# Patient Record
Sex: MALE | Race: OTHER | HISPANIC OR LATINO
[De-identification: names, ages, dates, MRNs, and addresses within clinical notes are randomized per-mention and may not be internally consistent; named-entity substitution may affect disease eponyms.]

---

## 2019-09-30 ENCOUNTER — APPOINTMENT (OUTPATIENT)
Dept: ORTHOPEDIC SURGERY | Facility: CLINIC | Age: 84
End: 2019-09-30
Payer: MEDICARE

## 2019-09-30 VITALS
WEIGHT: 180 LBS | DIASTOLIC BLOOD PRESSURE: 73 MMHG | HEART RATE: 56 BPM | SYSTOLIC BLOOD PRESSURE: 172 MMHG | BODY MASS INDEX: 28.93 KG/M2 | HEIGHT: 66 IN

## 2019-09-30 DIAGNOSIS — M54.5 LOW BACK PAIN: ICD-10-CM

## 2019-09-30 PROCEDURE — 99204 OFFICE O/P NEW MOD 45 MIN: CPT

## 2019-09-30 PROCEDURE — 72100 X-RAY EXAM L-S SPINE 2/3 VWS: CPT | Mod: 26

## 2019-09-30 NOTE — PHYSICAL EXAM
[de-identified] : Lumbar xray today with degenerative lumbar spondylosis [de-identified] : Spine: CONSTITUTIONAL: The patient is a very pleasant individual who is well-nourished and who appears stated age.\par \par PSYCHIATRIC: The patient is alert and oriented X 3 and in no apparent distress.\par HEENT: Atraumatic and is nonsyndromic in appearance.\par \par RESPIRATORY: Respiratory rate is regular, No MARTINEZ\par \par LYMPHATICS: There is no inguinal lymphadenopathy\par \par INTEGUMENTARY: Skin is clean, dry, and intact about the bilateral lower extremities and lumbar spine.\par \par VASCULAR: There is brisk capillary refill about the bilateral lower extremities and dorsalis pedis pulses are 2/4. \par \par NEUROLOGIC: There are no pathologic reflexes. There is no decrease in sensation of the bilateral lower extremities on Wartenberg pinwheel examination. Deep tendon reflexes are well maintained at 2+/4 of the bilateral lower extremities and are symmetric. \par \par MUSCULOSKELETAL: There is no visible muscular atrophy. Manual motor strength is well maintained in the bilateral lower extremities. Range of motion of lumbar spine is well maintained. The patient ambulates in a non-myelopathic manner. Negative tension sign and straight leg raise bilaterally. Quad extension, ankle dorsiflexion, EHL, plantar flexion, and ankle eversion are well preserved. Normal secondary orthopaedic exam of bilateral piriformis, hips, greater trochanters, knees.

## 2019-09-30 NOTE — HISTORY OF PRESENT ILLNESS
[de-identified] : 85yo M Pmhx HTN, Afib on warfarin presents with complaints of lumbar spine pain radiating to buttock.  He reports pain is constant for over 1 year aching, across lumbar spine pain level 8/10.  His symptoms worsen with standing walking, bending and lifting.  He is able to walk for 30 min but his pain worsens.  He has improvement with sitting.  He has done PT for 2 months without significant improvement.  He takes tylenol as needed for pain with minimal improvement of his symptoms.   [Pain Location] : pain [Worsening] : worsening [___ yrs] : [unfilled] year(s) ago [7] : a current pain level of 7/10 [Sitting] : sitting [Daily] : ~He/She~ states the symptoms seem to be occuring daily [Bending] : worsened by bending [Lifting] : worsened by lifting [Walking] : worsened by walking [Acetaminophen] : not relieved by acetaminophen [Physical Therapy] : not relieved by physical therapy [Rest] : relieved by rest [Incontinence] : no incontinence [Urinary Ret.] : no urinary retention

## 2019-09-30 NOTE — DISCUSSION/SUMMARY
[de-identified] : 85yo with Lumbar degenerative disc disease prolonged lumbago.  Will initiate conservative management with physical therapy for core strengthening modalities, decreased pain modalities and medical massage. Patient was provided a prescription of Medrol dose pack with guarded use due to possible increase to INR and advised to have INR evaluated, continue Tylenol prn pain. [place] to [place] and will start PT after that time.  Lumbar MRI to evaluate for spinal stenosis due to persistent lumbago with radiating pain to buttock upon stance and walking.  Followup in 6-8 weeks for repeat clinical assessment and MRI review.\par

## 2019-11-06 ENCOUNTER — APPOINTMENT (OUTPATIENT)
Dept: ORTHOPEDIC SURGERY | Facility: CLINIC | Age: 84
End: 2019-11-06

## 2020-01-08 ENCOUNTER — APPOINTMENT (OUTPATIENT)
Dept: ORTHOPEDIC SURGERY | Facility: CLINIC | Age: 85
End: 2020-01-08
Payer: MEDICARE

## 2020-01-08 VITALS
SYSTOLIC BLOOD PRESSURE: 129 MMHG | BODY MASS INDEX: 28.93 KG/M2 | WEIGHT: 180 LBS | HEIGHT: 66 IN | HEART RATE: 57 BPM | DIASTOLIC BLOOD PRESSURE: 61 MMHG

## 2020-01-08 DIAGNOSIS — Z78.9 OTHER SPECIFIED HEALTH STATUS: ICD-10-CM

## 2020-01-08 DIAGNOSIS — Z80.0 FAMILY HISTORY OF MALIGNANT NEOPLASM OF DIGESTIVE ORGANS: ICD-10-CM

## 2020-01-08 DIAGNOSIS — S46.211A STRAIN OF MUSCLE, FASCIA AND TENDON OF OTHER PARTS OF BICEPS, RIGHT ARM, INITIAL ENCOUNTER: ICD-10-CM

## 2020-01-08 PROCEDURE — 99214 OFFICE O/P EST MOD 30 MIN: CPT

## 2020-01-08 NOTE — HISTORY OF PRESENT ILLNESS
[de-identified] : 85yo M presents for follow up on lumbar spine pain.  He reports having much improvement from back pain until recently.  He reports while in [place] he picked up a heavy luggage approximately 2 months ago and felt a pop to the RUE and was unable to lift, developed a large hematoma and shortening of the muscle.  He states he has pain radiating from the RUE down to the fingers and having pain with gripping objects and unable to lift objects.  He did not obtain the lumbar xray from last visit. He reports mechanical movements worsens his back pain.  He denies any LE radiculopathy or weakness. [Pain Location] : pain [Worsening] : worsening [___ mths] : [unfilled] month(s) ago [8] : a current pain level of 8/10 [Daily] : ~He/She~ states the symptoms seem to be occuring daily [Lifting] : worsened by lifting [Bending] : worsened by bending [Urinary Ret.] : no urinary retention [Incontinence] : no incontinence [Walking] : worsened by walking

## 2020-01-08 NOTE — DISCUSSION/SUMMARY
[de-identified] : 83yo M with lumbar degenerative disc disease and bicep proximal tendon rupture of the RUE.  He was provided a new PT script and MRI of lumbar spine to evaluate for spinal stenosis. At this time will hold off on Medrol dose pack, pt can use Tylenol 1000mg po q8h prn pain.   He was provided with a MRI of RUE to evaluate for bicep tendon rupture and will be referred to Dr. Murray for further evaluation and management.  He will rTO in 4-6wks for Lumbar MRI review and repeat assessment

## 2020-02-18 ENCOUNTER — APPOINTMENT (OUTPATIENT)
Dept: ORTHOPEDIC SURGERY | Facility: CLINIC | Age: 85
End: 2020-02-18
Payer: MEDICARE

## 2020-02-18 VITALS
BODY MASS INDEX: 28.93 KG/M2 | HEART RATE: 53 BPM | SYSTOLIC BLOOD PRESSURE: 159 MMHG | HEIGHT: 66 IN | DIASTOLIC BLOOD PRESSURE: 72 MMHG | WEIGHT: 180 LBS

## 2020-02-18 DIAGNOSIS — M51.36 OTHER INTERVERTEBRAL DISC DEGENERATION, LUMBAR REGION: ICD-10-CM

## 2020-02-18 DIAGNOSIS — M48.062 SPINAL STENOSIS, LUMBAR REGION WITH NEUROGENIC CLAUDICATION: ICD-10-CM

## 2020-02-18 PROCEDURE — 72100 X-RAY EXAM L-S SPINE 2/3 VWS: CPT

## 2020-02-18 PROCEDURE — 99214 OFFICE O/P EST MOD 30 MIN: CPT

## 2020-02-18 NOTE — DISCUSSION/SUMMARY
[Medication Risks Reviewed] : Medication risks reviewed [de-identified] : I have recommended that the pt continue with a conservative treatment plan. Pt has been referred to physical therapy for decreased pain modalities and increased function. The pt has been encouraged to consider pain management for possible injection therapy. A Medrol Dose Pack was provided for anti-inflammatory properties. I have referred him to Dr. Murray for management regarding biceps tendon tear and rotator cuff tear.  He may follow up here on a PRN basis with regards to his lumbar spine for repeat clinical evaluation.

## 2020-02-18 NOTE — HISTORY OF PRESENT ILLNESS
[Pain Location] : pain [Bending] : worsened by bending [Lifting] : worsened by lifting [Walking] : worsened by walking [de-identified] : 84 year old male presents for MRI review and lumbar spine pain follow up. Pt continues to c/o back pain and RUE pain. He states he has pain radiating from the RUE down to the fingers and having pain with gripping objects and unable to lift objects.  He denies any LE radiculopathy or weakness. Pt was enrolled in PT for 3 months in the past but is not currently active in PT. The pt is accompanied by his son who states he had relief from Medrol Dose Pack.  Melvina questionnaire is negative.  [Incontinence] : no incontinence [Urinary Ret.] : no urinary retention

## 2020-02-18 NOTE — PHYSICAL EXAM
[de-identified] : CONSTITUTIONAL: The patient is a very pleasant individual who is well-nourished and who appears stated age.\par \par PSYCHIATRIC: The patient is alert and oriented X 3 and in no apparent distress.\par HEENT: Atraumatic and is nonsyndromic in appearance.\par \par RESPIRATORY: Respiratory rate is regular, No MARTINEZ\par \par LYMPHATICS: There is no inguinal lymphadenopathy\par \par INTEGUMENTARY: Skin is clean, dry, and intact about the bilateral lower extremities and lumbar spine.\par \par VASCULAR: There is brisk capillary refill about the bilateral lower extremities and dorsalis pedis pulses are 2/4. \par \par NEUROLOGIC: There are no pathologic reflexes. There is no decrease in sensation of the bilateral lower extremities on Wartenberg pinwheel examination. Deep tendon reflexes are well maintained at 2+/4 of the bilateral lower extremities and are symmetric. \par \par MUSCULOSKELETAL: There is no visible muscular atrophy. Manual motor strength is well maintained in the bilateral lower extremities. Range of motion of lumbar spine is well maintained. The patient ambulates in a non-myelopathic manner. Negative tension sign and straight leg raise bilaterally. Quad extension, ankle dorsiflexion, EHL, plantar flexion, and ankle eversion are well preserved. Normal secondary orthopaedic exam of bilateral piriformis, hips, greater trochanters, knees. \par \par Exam of the RUE with visible lump to belly of  bicep muscle There is TTP to the anterior aspect of bicipital groove and TTP to proximal aspect of the bicep lump.  Motor strength is 5/5 to RUE [de-identified] : Xray of the lumbar spine taken today shows age appropriate lumbar spondylosis \par \par MRI of the lumbar spine taken at [facility] dated 2/1/2020 and reviewed today demonstrates severe spinal stenosis at L4-L5, moderate spinal stenosis at L5-S1 an age appropriate lumbar degenerative disc disease.\par \par MRI of the RUE taken at [facility] dated, 2/9/2020 and reviewed today shows full thickness proximal bicep tear, supraspinatus tendinosis and full thickness tear

## 2020-02-18 NOTE — ADDENDUM
[FreeTextEntry1] : Documented by aLtia Graves acting as a scribe for ROWAN Galvan. 02/18/2020\par \par All medical record entries made by the Scribe were at my, ROWAN Galvan, direction and personally dictated by me on 02/18/2020 . I have reviewed the chart and agree that the record accurately reflects my personal performance of the history, physical exam, assessment and plan. I have also personally directed, reviewed, and agreed with the chart.

## 2020-03-10 ENCOUNTER — APPOINTMENT (OUTPATIENT)
Dept: CARDIOLOGY | Facility: CLINIC | Age: 85
End: 2020-03-10
Payer: MEDICARE

## 2020-03-10 ENCOUNTER — NON-APPOINTMENT (OUTPATIENT)
Age: 85
End: 2020-03-10

## 2020-03-10 VITALS
RESPIRATION RATE: 16 BRPM | HEART RATE: 52 BPM | DIASTOLIC BLOOD PRESSURE: 70 MMHG | HEIGHT: 66 IN | BODY MASS INDEX: 29.73 KG/M2 | WEIGHT: 185 LBS | SYSTOLIC BLOOD PRESSURE: 126 MMHG

## 2020-03-10 DIAGNOSIS — Z86.79 PERSONAL HISTORY OF OTHER DISEASES OF THE CIRCULATORY SYSTEM: ICD-10-CM

## 2020-03-10 DIAGNOSIS — R42 DIZZINESS AND GIDDINESS: ICD-10-CM

## 2020-03-10 LAB — INR PPP: 2.3 RATIO

## 2020-03-10 PROCEDURE — 99204 OFFICE O/P NEW MOD 45 MIN: CPT

## 2020-03-10 PROCEDURE — 93000 ELECTROCARDIOGRAM COMPLETE: CPT

## 2020-03-10 RX ORDER — NAPROXEN 500 MG/1
500 TABLET ORAL DAILY
Refills: 0 | Status: ACTIVE | COMMUNITY

## 2020-03-10 RX ORDER — WARFARIN 3 MG/1
3 TABLET ORAL DAILY
Refills: 0 | Status: DISCONTINUED | COMMUNITY
End: 2020-03-10

## 2020-03-10 RX ORDER — METOPROLOL TARTRATE 25 MG/1
25 TABLET, FILM COATED ORAL
Refills: 0 | Status: DISCONTINUED | COMMUNITY
End: 2020-03-10

## 2020-03-10 NOTE — DISCUSSION/SUMMARY
[FreeTextEntry1] : Patient is a 86yo M with Parkinsons disease, PAF here for cardiac evaluation. Apparently one episode 10 years ago requiring hospitalization. ? symptoms related to it at the time. NO clear symptoms since and no documented AF.  [id number] = 2. For now I do recommend continuing A/C given lack of clear symptoms related initially, despite no clear recurrence. Discussed ILR to help evaluate further and patient/son will consider. Will change coumadin to eliquis due to difficulty monitoring and dietary restrictions. Currently no falls and gait steady,  but as things progress with parkinsons will need to re-address A/C. \par \par 1. Stop coumadin 3 days then start eliquis 5mg po bid\par 2. Continue propranolol, tolerating well and BP controlled. No clear benefit in tremor, will consider changing back to metoprolol but no indication to at this time\par 3. Increase physical activity as tolerated \par 4. Echo to evaluate PAF and ECG findings (LAE/IVCD), carotid to evluate dizziness\par 5. Follow up 2 months\par 6. Consider ILR\par

## 2020-03-10 NOTE — PHYSICAL EXAM
[General Appearance - Well Developed] : well developed [General Appearance - Well Nourished] : well nourished [Normal Conjunctiva] : the conjunctiva exhibited no abnormalities [Normal Oropharynx] : normal oropharynx [Normal Jugular Venous V Waves Present] : normal jugular venous V waves present [Heart Rate And Rhythm] : heart rate and rhythm were normal [Murmurs] : no murmurs present [Edema] : no peripheral edema present [] : no respiratory distress [Respiration, Rhythm And Depth] : normal respiratory rhythm and effort [Auscultation Breath Sounds / Voice Sounds] : lungs were clear to auscultation bilaterally [Bowel Sounds] : normal bowel sounds [Abdomen Soft] : soft [Abdomen Tenderness] : non-tender [Abnormal Walk] : normal gait [Nail Clubbing] : no clubbing of the fingernails [Cyanosis, Localized] : no localized cyanosis [Skin Color & Pigmentation] : normal skin color and pigmentation [Skin Turgor] : normal skin turgor [Oriented To Time, Place, And Person] : oriented to person, place, and time [Affect] : the affect was normal [FreeTextEntry1] : resting pill rolling tremor

## 2020-03-10 NOTE — HISTORY OF PRESENT ILLNESS
[FreeTextEntry1] : Patient is a 86yo M with PAF, Parkinsons disease here for cardiac evaluation. Continues to work, and remains active. Struggles with back pain but no exertional CP/SOB. Patient denies PND/orthopnea/edema/palpitations/syncope/claudication.  Lives with his son. Originally from [place]. [place] put on coumadin for PAF starting about 10 years. Hospitalized at that time, was feeling dizzy then but no SOB/palps/CP. Since then been well. Occasionally dizzy, but does not fall or pass out. Last seen by Cardiologist 2 years back and underwent stress testing. \par \par ROS: GI negative, all others negative

## 2020-03-16 ENCOUNTER — APPOINTMENT (OUTPATIENT)
Dept: ORTHOPEDIC SURGERY | Facility: CLINIC | Age: 85
End: 2020-03-16
Payer: MEDICARE

## 2020-03-16 VITALS
BODY MASS INDEX: 30.49 KG/M2 | HEART RATE: 56 BPM | WEIGHT: 183 LBS | DIASTOLIC BLOOD PRESSURE: 66 MMHG | SYSTOLIC BLOOD PRESSURE: 138 MMHG | HEIGHT: 65 IN

## 2020-03-16 DIAGNOSIS — S46.011D STRAIN OF MUSCLE(S) AND TENDON(S) OF THE ROTATOR CUFF OF RIGHT SHOULDER, SUBSEQUENT ENCOUNTER: ICD-10-CM

## 2020-03-16 DIAGNOSIS — S46.219A STRAIN OF MUSCLE, FASCIA AND TENDON OF OTHER PARTS OF BICEPS, UNSPECIFIED ARM, INITIAL ENCOUNTER: ICD-10-CM

## 2020-03-16 PROCEDURE — 99214 OFFICE O/P EST MOD 30 MIN: CPT

## 2020-03-16 NOTE — DISCUSSION/SUMMARY
[de-identified] : Abundio is an 85-year-old male with right shoulder and right arm pain.  He is right shoulder pain is secondary to a supraspinatus tear with proximal biceps rupture.  His strength is very good today as well as his range of motion.  At this time I recommend continued physical therapy for the shoulder.  With regards to his hand numbness and weakness with gripping I do not believe that is coming from the shoulder.  I would like him to see a neurologist .  I will see him back in 6 to 8 weeks for clinical reassessment.  If he does have consistent pain of the shoulder we may consider cortisone injection.  He agrees with the above plan and all questions were answered.

## 2020-03-16 NOTE — REASON FOR VISIT
[Initial Visit] : an initial visit for [Shoulder Pain] : shoulder pain [FreeTextEntry2] : Right arm and shoulder pain

## 2020-03-16 NOTE — PHYSICAL EXAM
[de-identified] : Physical Exam:\par General: Well appearing, no acute distress, A&O\par Neurologic: A&Ox3, No focal deficits\par Head: NCAT without abrasions, lacerations, or ecchymosis to head, face, or scalp\par Eyes: No scleral icterus, no gross abnormalities\par Respiratory: Equal chest wall expansion bilaterally, no accessory muscle use\par Lymphatic: No lymphadenopathy palpated\par Skin: Warm and dry\par Psychiatric: Normal mood and affect\par \par Right Shoulder\par ·	Inspection/Palpation: no tenderness, swelling or deformities\par ·	Range of Motion: no crepitus with ROM; Active ; ER at side 25; IR to L4; Passive ; ER at side 40; IR to L4\par ·	Strength: forward elevation in scapular plane [4/5], internal rotation [4/5], external rotation [4/5], adduction [4/5] and abduction [4/5]\par ·	Stability: no joint instability on provocative testing\par ·	Tests: Argueta test positive, Neer positive, positive drop arm test secondary to pain, bear hug test positive, Napolean sign negative, cross arm adduction negative, lift off sign positive, hornblowers sign negative, speeds test negative, Yergason's test negative, no bicipital groove tenderness, Galvan's Active Compression test positive Whipple test positive, bicep's load II test negative\par \par Left Shoulder\par ·	Inspection/Palpation: no tenderness, swelling or deformities\par ·	Range of Motion: full and painless in all planes, no crepitus\par ·	Strength: forward elevation in scapular plane 5/5, internal rotation 5/5, external rotation 5/5, adduction 5/5 and abduction 5/5\par ·	Stability: no joint instability on provocative testing\par Tests: Argueta test negative, Neer sign negative, negative drop arm test secondary to pain, bear hug test negative, Napolean sign negative, cross arm adduction negative, lift off sign positive, hornblowers sign negative, speeds test negative, Yergason's test negative, no bicipital groove tenderness, Galvan's Active Compression test negative [de-identified] : MRI reviewed.  MRI shows supraspinatus tendinosis with full-thickness tear.  Full-thickness proximal biceps tendon tear noted.

## 2020-03-16 NOTE — REVIEW OF SYSTEMS
[Joint Pain] : joint pain [Joint Stiffness] : joint stiffness [Negative] : Heme/Lymph [FreeTextEntry9] : right hsoulder

## 2020-05-08 ENCOUNTER — APPOINTMENT (OUTPATIENT)
Dept: CARDIOLOGY | Facility: CLINIC | Age: 85
End: 2020-05-08

## 2020-05-19 ENCOUNTER — APPOINTMENT (OUTPATIENT)
Dept: CARDIOLOGY | Facility: CLINIC | Age: 85
End: 2020-05-19
Payer: MEDICARE

## 2020-05-19 DIAGNOSIS — I48.0 PAROXYSMAL ATRIAL FIBRILLATION: ICD-10-CM

## 2020-05-19 DIAGNOSIS — R94.31 ABNORMAL ELECTROCARDIOGRAM [ECG] [EKG]: ICD-10-CM

## 2020-05-19 PROCEDURE — 99441: CPT

## 2020-05-19 RX ORDER — PROPRANOLOL HYDROCHLORIDE 60 MG/1
60 TABLET ORAL
Refills: 0 | Status: DISCONTINUED | COMMUNITY
End: 2020-05-19

## 2020-05-19 RX ORDER — APIXABAN 5 MG/1
5 TABLET, FILM COATED ORAL
Qty: 60 | Refills: 5 | Status: DISCONTINUED | COMMUNITY
Start: 2020-03-10 | End: 2020-05-19

## 2020-05-19 RX ORDER — TIZANIDINE 4 MG/1
4 TABLET ORAL
Refills: 0 | Status: DISCONTINUED | COMMUNITY
End: 2020-05-19

## 2020-06-11 RX ORDER — METHYLPREDNISOLONE 4 MG/1
4 TABLET ORAL
Qty: 1 | Refills: 0 | Status: ACTIVE | COMMUNITY
Start: 2020-06-11 | End: 1900-01-01

## 2021-08-16 NOTE — HISTORY OF PRESENT ILLNESS
Do you want to fit her in somewhere.   Dr Travis Jean has some openings this afternoon as well [de-identified] : SAJI is a 85 year male who presents today with complaints of right arm and shoulder pain. Patient states he picked up a piece of luggage in October feeling pain to shoulder patient locates pain to anterior/lateral shoulder. patient was seen by Dr. Mackay for back and shoulder pain, had MRI and referred here. patient states pain with ROM. patient did have PT for back, was placed on medrol with some relief\par Patient using Tizandine and naproxen with some relief.

## 2023-09-20 ENCOUNTER — NON-APPOINTMENT (OUTPATIENT)
Age: 88
End: 2023-09-20

## 2023-09-20 DIAGNOSIS — Z87.891 PERSONAL HISTORY OF NICOTINE DEPENDENCE: ICD-10-CM

## 2023-09-20 DIAGNOSIS — Z63.4 DISAPPEARANCE AND DEATH OF FAMILY MEMBER: ICD-10-CM

## 2023-09-20 DIAGNOSIS — Z78.9 OTHER SPECIFIED HEALTH STATUS: ICD-10-CM

## 2023-09-20 DIAGNOSIS — I10 ESSENTIAL (PRIMARY) HYPERTENSION: ICD-10-CM

## 2023-09-20 DIAGNOSIS — Z82.49 FAMILY HISTORY OF ISCHEMIC HEART DISEASE AND OTHER DISEASES OF THE CIRCULATORY SYSTEM: ICD-10-CM

## 2023-09-20 RX ORDER — WARFARIN 3 MG/1
3 TABLET ORAL DAILY
Refills: 0 | Status: ACTIVE | COMMUNITY

## 2023-09-20 RX ORDER — METOPROLOL SUCCINATE 25 MG/1
25 TABLET, EXTENDED RELEASE ORAL DAILY
Refills: 0 | Status: ACTIVE | COMMUNITY

## 2023-09-20 SDOH — SOCIAL STABILITY - SOCIAL INSECURITY: DISSAPEARANCE AND DEATH OF FAMILY MEMBER: Z63.4

## 2024-03-21 ENCOUNTER — APPOINTMENT (OUTPATIENT)
Dept: UROLOGY | Facility: CLINIC | Age: 89
End: 2024-03-21
Payer: MEDICARE

## 2024-03-21 VITALS
DIASTOLIC BLOOD PRESSURE: 69 MMHG | SYSTOLIC BLOOD PRESSURE: 161 MMHG | HEIGHT: 65 IN | BODY MASS INDEX: 28.32 KG/M2 | HEART RATE: 58 BPM | WEIGHT: 170 LBS

## 2024-03-21 DIAGNOSIS — N13.8 BENIGN PROSTATIC HYPERPLASIA WITH LOWER URINARY TRACT SYMPMS: ICD-10-CM

## 2024-03-21 DIAGNOSIS — Z80.0 FAMILY HISTORY OF MALIGNANT NEOPLASM OF DIGESTIVE ORGANS: ICD-10-CM

## 2024-03-21 DIAGNOSIS — N40.1 BENIGN PROSTATIC HYPERPLASIA WITH LOWER URINARY TRACT SYMPMS: ICD-10-CM

## 2024-03-21 PROCEDURE — 99204 OFFICE O/P NEW MOD 45 MIN: CPT

## 2024-03-21 RX ORDER — TAMSULOSIN HYDROCHLORIDE 0.4 MG/1
0.4 CAPSULE ORAL
Qty: 30 | Refills: 1 | Status: ACTIVE | COMMUNITY
Start: 2024-03-21 | End: 1900-01-01

## 2024-03-21 NOTE — LETTER BODY
[Dear  ___] : Dear  [unfilled], [Please see my note below.] : Please see my note below. [Consult Letter:] : I had the pleasure of evaluating your patient, [unfilled]. [Sincerely,] : Sincerely, [Consult Closing:] : Thank you very much for allowing me to participate in the care of this patient.  If you have any questions, please do not hesitate to contact me. [FreeTextEntry3] : Anay Melton DO Genitourinary Medicine [facility]

## 2024-03-21 NOTE — HISTORY OF PRESENT ILLNESS
[FreeTextEntry1] : 89M hx of Parkinson's disease presents for bothersome urinary symptoms He reports urinary frequency q2 hours, nocturia 5x, urgency and urge incontinence He reports some dysuria. No hematuria UA neg for rbc on 6/2023  CT from 2/2023 showed enlarged prostate

## 2024-03-21 NOTE — ASSESSMENT
[FreeTextEntry1] : LUTS: urine check discussed symptoms may be related to bph vs Parkinson's disease will try course of flomax, discussed possible side effect of dizziness f/u 1 month for pvr

## 2024-03-21 NOTE — PHYSICAL EXAM
[Normal Appearance] : normal appearance [Well Groomed] : well groomed [General Appearance - In No Acute Distress] : no acute distress [Edema] : no peripheral edema [Respiration, Rhythm And Depth] : normal respiratory rhythm and effort [Exaggerated Use Of Accessory Muscles For Inspiration] : no accessory muscle use [Normal Station and Gait] : the gait and station were normal for the patient's age [] : no rash [Oriented To Time, Place, And Person] : oriented to person, place, and time [Mood] : the mood was normal [Affect] : the affect was normal [de-identified] : hand tremor

## 2024-03-22 LAB
APPEARANCE: CLEAR
BACTERIA: NEGATIVE /HPF
BILIRUBIN URINE: NEGATIVE
BLOOD URINE: NEGATIVE
CAST: 5 /LPF
COLOR: YELLOW
EPITHELIAL CELLS: 3 /HPF
GLUCOSE QUALITATIVE U: NEGATIVE MG/DL
KETONES URINE: NEGATIVE MG/DL
LEUKOCYTE ESTERASE URINE: ABNORMAL
MICROSCOPIC-UA: NORMAL
NITRITE URINE: NEGATIVE
PH URINE: 5.5
PROTEIN URINE: 30 MG/DL
RED BLOOD CELLS URINE: 2 /HPF
SPECIFIC GRAVITY URINE: 1.02
URINE CYTOLOGY: NORMAL
UROBILINOGEN URINE: 0.2 MG/DL
WHITE BLOOD CELLS URINE: 6 /HPF

## 2024-03-25 LAB — BACTERIA UR CULT: NORMAL

## 2024-04-18 ENCOUNTER — APPOINTMENT (OUTPATIENT)
Dept: UROLOGY | Facility: CLINIC | Age: 89
End: 2024-04-18

## 2024-05-02 ENCOUNTER — APPOINTMENT (OUTPATIENT)
Dept: UROLOGY | Facility: CLINIC | Age: 89
End: 2024-05-02
Payer: MEDICARE

## 2024-05-02 VITALS
HEIGHT: 65 IN | WEIGHT: 170 LBS | HEART RATE: 58 BPM | SYSTOLIC BLOOD PRESSURE: 170 MMHG | DIASTOLIC BLOOD PRESSURE: 63 MMHG | BODY MASS INDEX: 28.32 KG/M2

## 2024-05-02 DIAGNOSIS — R39.9 UNSPECIFIED SYMPTOMS AND SIGNS INVOLVING THE GENITOURINARY SYSTEM: ICD-10-CM

## 2024-05-02 PROCEDURE — 99213 OFFICE O/P EST LOW 20 MIN: CPT

## 2024-05-02 NOTE — HISTORY OF PRESENT ILLNESS
[FreeTextEntry1] : 89M hx of Parkinson's disease presents for f/u He reports urinary frequency q2 hours, nocturia 5x, urgency and urge incontinence He tried 1 month of flomax without any relief. Stopped flomax  pvr 20cc today  CT from 2/2023 showed enlarged prostate

## 2024-05-02 NOTE — ASSESSMENT
[FreeTextEntry1] : LUTS: not in retention could be related to OAB failed flomax, doesn't want to try any OAB meds due to side effect profile discussed PTNS. will consider starting

## 2024-05-02 NOTE — PHYSICAL EXAM
[Normal Appearance] : normal appearance [Well Groomed] : well groomed [Edema] : no peripheral edema [General Appearance - In No Acute Distress] : no acute distress [Respiration, Rhythm And Depth] : normal respiratory rhythm and effort [Exaggerated Use Of Accessory Muscles For Inspiration] : no accessory muscle use [Normal Station and Gait] : the gait and station were normal for the patient's age [] : no rash [Oriented To Time, Place, And Person] : oriented to person, place, and time [Affect] : the affect was normal [Mood] : the mood was normal [de-identified] : hand tremor

## 2024-08-01 ENCOUNTER — APPOINTMENT (OUTPATIENT)
Dept: UROLOGY | Facility: CLINIC | Age: 89
End: 2024-08-01

## 2024-08-08 ENCOUNTER — APPOINTMENT (OUTPATIENT)
Dept: UROLOGY | Facility: CLINIC | Age: 89
End: 2024-08-08

## 2024-08-15 ENCOUNTER — APPOINTMENT (OUTPATIENT)
Dept: UROLOGY | Facility: CLINIC | Age: 89
End: 2024-08-15

## 2024-08-22 ENCOUNTER — APPOINTMENT (OUTPATIENT)
Dept: UROLOGY | Facility: CLINIC | Age: 89
End: 2024-08-22

## 2024-08-29 ENCOUNTER — NON-APPOINTMENT (OUTPATIENT)
Age: 89
End: 2024-08-29

## 2024-08-29 ENCOUNTER — APPOINTMENT (OUTPATIENT)
Dept: CARDIOLOGY | Facility: CLINIC | Age: 89
End: 2024-08-29
Payer: MEDICARE

## 2024-08-29 ENCOUNTER — APPOINTMENT (OUTPATIENT)
Dept: UROLOGY | Facility: CLINIC | Age: 89
End: 2024-08-29

## 2024-08-29 VITALS
HEART RATE: 82 BPM | WEIGHT: 170 LBS | OXYGEN SATURATION: 98 % | DIASTOLIC BLOOD PRESSURE: 62 MMHG | SYSTOLIC BLOOD PRESSURE: 120 MMHG | BODY MASS INDEX: 28.29 KG/M2

## 2024-08-29 DIAGNOSIS — I10 ESSENTIAL (PRIMARY) HYPERTENSION: ICD-10-CM

## 2024-08-29 DIAGNOSIS — Z95.0 PRESENCE OF CARDIAC PACEMAKER: ICD-10-CM

## 2024-08-29 DIAGNOSIS — I48.0 PAROXYSMAL ATRIAL FIBRILLATION: ICD-10-CM

## 2024-08-29 PROCEDURE — 99204 OFFICE O/P NEW MOD 45 MIN: CPT | Mod: 25

## 2024-08-29 PROCEDURE — 93000 ELECTROCARDIOGRAM COMPLETE: CPT

## 2024-08-29 RX ORDER — SPIRONOLACTONE 25 MG/1
25 TABLET ORAL
Refills: 0 | Status: ACTIVE | COMMUNITY
Start: 2024-08-29

## 2024-08-29 RX ORDER — APIXABAN 5 MG/1
5 TABLET, FILM COATED ORAL
Refills: 0 | Status: ACTIVE | COMMUNITY
Start: 2024-08-29

## 2024-08-29 RX ORDER — EMPAGLIFLOZIN 10 MG/1
10 TABLET, FILM COATED ORAL
Refills: 0 | Status: ACTIVE | COMMUNITY
Start: 2024-08-29

## 2024-08-29 NOTE — DISCUSSION/SUMMARY
[EKG obtained to assist in diagnosis and management of assessed problem(s)] : EKG obtained to assist in diagnosis and management of assessed problem(s) [FreeTextEntry1] : Pt is a 89 y/o M PMH PAF on Eliquis, PPM 08/2024 at [facility], Parkinsons He was feeling dizzy the beginning of this month and went to the ED - was found to have low heartrate and is now s/p PPM.  He is following with EP at [facility].  Records unavailable to me at this time.  But med list shows that he was started on spironolactone and jardiance  pt was placed on spironolactone and jardiance at [facility] - ?HF unknown LV function pt appears euvolemic today will try to get records Will check transthoracic echocardiogram to evaluate left ventricular function and assess for any structural abnormalities   PAF: c/w Eliquis for AV pt denies bleeding issues, no history of blood transfusions, no recent falls/syncope c/w metoprolol for rate control  PPM: placed 08/2024 he is following with EP at [facility]  Pt will return in 6 mnths or sooner as needed but I encouraged communication via phone or portal if necessary.  We will call pt with test results when applicable and arrange for expedited follow up if necessary.  The described plan was discussed with the pt and family members.  All questions and concerns were addressed to the best of my knowledge.

## 2024-08-29 NOTE — HISTORY OF PRESENT ILLNESS
[FreeTextEntry1] : Pt is a 89 y/o M who is referred here today by their PCP for evaluation.  Pt has PMH PAF on Eliquis, [facility], Parkinsons He is accompanied by family members who provide history He was feeling dizzy the beginning of this month and went to the ED - was found to have low heartrate and is now s/p PPM.  He is following with EP at [facility].  Records unavailable to me at this time.  But med list shows that he was started on spironolactone and jardiance He is feeling well since then - denies CP, SOB, diaphoresis, palpitations, dizziness, syncope, LE edema, PND, orthopnea.    PCP Dr Paredes Previous surgeries: none Family hx: no SCD Smoking status: quit in his 60's (was social smoker) social ETOH no drug use Current exercise: none Daily water intake: "not much"

## 2024-09-05 ENCOUNTER — APPOINTMENT (OUTPATIENT)
Dept: UROLOGY | Facility: CLINIC | Age: 89
End: 2024-09-05

## 2024-09-12 ENCOUNTER — APPOINTMENT (OUTPATIENT)
Dept: UROLOGY | Facility: CLINIC | Age: 89
End: 2024-09-12

## 2024-09-19 ENCOUNTER — APPOINTMENT (OUTPATIENT)
Dept: UROLOGY | Facility: CLINIC | Age: 89
End: 2024-09-19

## 2024-09-26 ENCOUNTER — APPOINTMENT (OUTPATIENT)
Dept: UROLOGY | Facility: CLINIC | Age: 89
End: 2024-09-26

## 2024-10-03 ENCOUNTER — APPOINTMENT (OUTPATIENT)
Dept: UROLOGY | Facility: CLINIC | Age: 89
End: 2024-10-03

## 2024-10-10 ENCOUNTER — APPOINTMENT (OUTPATIENT)
Dept: UROLOGY | Facility: CLINIC | Age: 89
End: 2024-10-10

## 2024-10-17 ENCOUNTER — APPOINTMENT (OUTPATIENT)
Dept: UROLOGY | Facility: CLINIC | Age: 89
End: 2024-10-17

## 2024-10-24 ENCOUNTER — NON-APPOINTMENT (OUTPATIENT)
Age: 89
End: 2024-10-24

## 2024-10-24 ENCOUNTER — APPOINTMENT (OUTPATIENT)
Dept: CARDIOLOGY | Facility: CLINIC | Age: 89
End: 2024-10-24
Payer: MEDICARE

## 2024-10-24 VITALS
BODY MASS INDEX: 28.29 KG/M2 | DIASTOLIC BLOOD PRESSURE: 80 MMHG | SYSTOLIC BLOOD PRESSURE: 140 MMHG | HEART RATE: 95 BPM | WEIGHT: 170 LBS | OXYGEN SATURATION: 98 %

## 2024-10-24 DIAGNOSIS — Z95.0 PRESENCE OF CARDIAC PACEMAKER: ICD-10-CM

## 2024-10-24 DIAGNOSIS — I48.0 PAROXYSMAL ATRIAL FIBRILLATION: ICD-10-CM

## 2024-10-24 DIAGNOSIS — I10 ESSENTIAL (PRIMARY) HYPERTENSION: ICD-10-CM

## 2024-10-24 PROCEDURE — 99214 OFFICE O/P EST MOD 30 MIN: CPT | Mod: 25

## 2024-10-24 PROCEDURE — 93000 ELECTROCARDIOGRAM COMPLETE: CPT

## 2024-10-24 RX ORDER — WARFARIN 3 MG/1
3 TABLET ORAL
Qty: 90 | Refills: 1 | Status: ACTIVE | COMMUNITY
Start: 2024-10-24 | End: 1900-01-01

## 2024-11-15 ENCOUNTER — APPOINTMENT (OUTPATIENT)
Dept: CARDIOLOGY | Facility: CLINIC | Age: 89
End: 2024-11-15

## 2024-12-24 PROBLEM — F10.90 ALCOHOL USE: Status: INACTIVE | Noted: 2020-01-08

## 2025-01-02 ENCOUNTER — APPOINTMENT (OUTPATIENT)
Dept: CARDIOLOGY | Facility: CLINIC | Age: 89
End: 2025-01-02

## 2025-03-20 ENCOUNTER — APPOINTMENT (OUTPATIENT)
Dept: CARDIOLOGY | Facility: CLINIC | Age: 89
End: 2025-03-20
Payer: MEDICARE

## 2025-03-20 PROCEDURE — 93017 CV STRESS TEST TRACING ONLY: CPT

## 2025-03-20 PROCEDURE — 78452 HT MUSCLE IMAGE SPECT MULT: CPT

## 2025-03-20 PROCEDURE — 93018 CV STRESS TEST I&R ONLY: CPT

## 2025-03-20 PROCEDURE — A9500: CPT

## 2025-03-20 PROCEDURE — 93016 CV STRESS TEST SUPVJ ONLY: CPT

## 2025-05-01 ENCOUNTER — APPOINTMENT (OUTPATIENT)
Dept: CARDIOLOGY | Facility: CLINIC | Age: 89
End: 2025-05-01

## 2025-05-27 ENCOUNTER — RX RENEWAL (OUTPATIENT)
Age: 89
End: 2025-05-27

## 2025-06-05 ENCOUNTER — APPOINTMENT (OUTPATIENT)
Dept: CARDIOLOGY | Facility: CLINIC | Age: 89
End: 2025-06-05

## 2025-07-03 ENCOUNTER — APPOINTMENT (OUTPATIENT)
Dept: CARDIOLOGY | Facility: CLINIC | Age: 89
End: 2025-07-03

## 2025-08-05 ENCOUNTER — OFFICE (OUTPATIENT)
Facility: LOCATION | Age: OVER 89
Setting detail: OPHTHALMOLOGY
End: 2025-08-05
Payer: MEDICARE

## 2025-08-05 DIAGNOSIS — H00.022: ICD-10-CM

## 2025-08-05 DIAGNOSIS — H00.025: ICD-10-CM

## 2025-08-05 PROCEDURE — 99203 OFFICE O/P NEW LOW 30 MIN: CPT | Performed by: OPTOMETRIST

## 2025-08-05 ASSESSMENT — REFRACTION_CURRENTRX
OS_OVR_VA: 20/
OS_AXIS: 023
OS_VPRISM_DIRECTION: PROGS
OS_SPHERE: +1.50
OD_ADD: +2.50
OD_VPRISM_DIRECTION: PROGS
OD_CYLINDER: -1.75
OS_ADD: +2.50
OD_AXIS: 105
OD_SPHERE: +1.75
OD_OVR_VA: 20/
OS_CYLINDER: -1.25

## 2025-08-05 ASSESSMENT — KERATOMETRY
OD_K2POWER_DIOPTERS: 45.00
OD_K1POWER_DIOPTERS: 45.00
OS_AXISANGLE_DEGREES: 102
OD_AXISANGLE_DEGREES: 090
OS_K2POWER_DIOPTERS: 45.25
OS_K1POWER_DIOPTERS: 44.50

## 2025-08-05 ASSESSMENT — VISUAL ACUITY
OS_BCVA: 20/25-2
OD_BCVA: 20/30

## 2025-08-05 ASSESSMENT — REFRACTION_AUTOREFRACTION
OS_AXIS: 050
OD_AXIS: 097
OD_SPHERE: +1.25
OS_CYLINDER: -0.75
OS_SPHERE: +1.50
OD_CYLINDER: -0.75

## 2025-08-05 ASSESSMENT — LID EXAM ASSESSMENTS
OS_BLEPHARITIS: 3+
OD_BLEPHARITIS: 3+
OS_MEIBOMITIS: 3+
OD_MEIBOMITIS: 3+

## 2025-08-19 ENCOUNTER — RX ONLY (RX ONLY)
Age: OVER 89
End: 2025-08-19

## 2025-08-19 ENCOUNTER — OFFICE (OUTPATIENT)
Facility: LOCATION | Age: OVER 89
Setting detail: OPHTHALMOLOGY
End: 2025-08-19
Payer: MEDICARE

## 2025-08-19 DIAGNOSIS — H52.4: ICD-10-CM

## 2025-08-19 DIAGNOSIS — H00.025: ICD-10-CM

## 2025-08-19 DIAGNOSIS — H31.29: ICD-10-CM

## 2025-08-19 DIAGNOSIS — H00.022: ICD-10-CM

## 2025-08-19 DIAGNOSIS — H25.13: ICD-10-CM

## 2025-08-19 DIAGNOSIS — H11.151: ICD-10-CM

## 2025-08-19 PROCEDURE — 92015 DETERMINE REFRACTIVE STATE: CPT | Performed by: OPTOMETRIST

## 2025-08-19 PROCEDURE — 92250 FUNDUS PHOTOGRAPHY W/I&R: CPT | Performed by: OPTOMETRIST

## 2025-08-19 PROCEDURE — 92014 COMPRE OPH EXAM EST PT 1/>: CPT | Performed by: OPTOMETRIST

## 2025-08-19 ASSESSMENT — CONFRONTATIONAL VISUAL FIELD TEST (CVF)
OD_FINDINGS: FULL
OS_FINDINGS: FULL

## 2025-08-19 ASSESSMENT — REFRACTION_AUTOREFRACTION
OD_AXIS: 018
OS_AXIS: 151
OD_SPHERE: -0.25
OS_SPHERE: -0.75
OS_CYLINDER: +0.75
OD_CYLINDER: +1.25

## 2025-08-19 ASSESSMENT — REFRACTION_CURRENTRX
OD_AXIS: 023
OD_SPHERE: PLANO
OD_OVR_VA: 20/
OS_CYLINDER: +0.75
OD_ADD: +3.50
OS_ADD: +3.50
OS_AXIS: 112
OD_CYLINDER: +0.75
OD_VPRISM_DIRECTION: PROGS
OS_VPRISM_DIRECTION: PROGS
OS_OVR_VA: 20/
OS_SPHERE: -0.25

## 2025-08-19 ASSESSMENT — REFRACTION_MANIFEST
OS_SPHERE: -0.25
OU_VA: 20/30
OD_CYLINDER: +0.75
OD_SPHERE: -0.50
OS_AXIS: 150
OD_VA1: 20/25-1
OS_VA1: 20/30
OS_ADD: +3.50
OS_CYLINDER: +0.75
OD_ADD: +3.50
OD_AXIS: 020

## 2025-08-19 ASSESSMENT — VISUAL ACUITY
OD_BCVA: 20/30-1
OS_BCVA: 20/25-2

## 2025-08-19 ASSESSMENT — LID EXAM ASSESSMENTS
OD_BLEPHARITIS: 3+
OS_BLEPHARITIS: 3+
OD_MEIBOMITIS: 3+
OS_MEIBOMITIS: 3+

## 2025-08-19 ASSESSMENT — KERATOMETRY
OD_K2POWER_DIOPTERS: 45.00
OS_AXISANGLE_DEGREES: 102
OD_K1POWER_DIOPTERS: 45.00
OS_K2POWER_DIOPTERS: 45.25
OS_K1POWER_DIOPTERS: 44.50
OD_AXISANGLE_DEGREES: 090